# Patient Record
Sex: FEMALE | Race: WHITE | Employment: FULL TIME | URBAN - METROPOLITAN AREA
[De-identification: names, ages, dates, MRNs, and addresses within clinical notes are randomized per-mention and may not be internally consistent; named-entity substitution may affect disease eponyms.]

---

## 2022-06-13 ENCOUNTER — OFFICE VISIT (OUTPATIENT)
Dept: URGENT CARE | Facility: MEDICAL CENTER | Age: 42
End: 2022-06-13
Payer: COMMERCIAL

## 2022-06-13 VITALS
BODY MASS INDEX: 35.87 KG/M2 | HEART RATE: 80 BPM | HEIGHT: 61 IN | OXYGEN SATURATION: 99 % | SYSTOLIC BLOOD PRESSURE: 148 MMHG | RESPIRATION RATE: 18 BRPM | TEMPERATURE: 98 F | DIASTOLIC BLOOD PRESSURE: 82 MMHG | WEIGHT: 190 LBS

## 2022-06-13 DIAGNOSIS — M77.12 LATERAL EPICONDYLITIS OF LEFT ELBOW: Primary | ICD-10-CM

## 2022-06-13 PROCEDURE — G0382 LEV 3 HOSP TYPE B ED VISIT: HCPCS | Performed by: PHYSICIAN ASSISTANT

## 2022-06-13 PROCEDURE — S9083 URGENT CARE CENTER GLOBAL: HCPCS | Performed by: PHYSICIAN ASSISTANT

## 2022-06-13 NOTE — LETTER
June 13, 2022     Patient: Dean Marino   YOB: 1980   Date of Visit: 6/13/2022       To Whom It May Concern: It is my medical opinion that Vicky Araiza be excused from arm/elbow use at work until 06/17/2022    If you have any questions or concerns, please don't hesitate to call  Sincerely,        Milagros Jensen PA-C    CC: Eliezer Galaviz Fears

## 2022-06-13 NOTE — PROGRESS NOTES
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          History of Present Illness       Patient is a 41 yo female who presents for evaluation of left elbow pain x 3 days  She denies any injury or trauma  Pain is located in the lateral aspect of the elbow  The pain radiates into the upper arm and down the arm into the wrist  Reports she works at [employer] and does frequent repetitive movements  Denies weakness, skin changes, swelling, fevers, chills  Review of Systems   Review of Systems   Constitutional: Negative for chills and fever  Musculoskeletal: Positive for arthralgias  Negative for joint swelling  Left elbow pain         Current Medications     No current outpatient medications on file  Current Allergies     Allergies as of 2022    (No Known Allergies)            The following portions of the patient's history were reviewed and updated as appropriate: allergies, current medications, past family history, past medical history, past social history, past surgical history and problem list      History reviewed  No pertinent past medical history      Past Surgical History:   Procedure Laterality Date    CHOLECYSTECTOMY         Family History   Problem Relation Age of Onset    Heart disease Mother     Kidney failure Mother     Leukemia Mother     Kidney failure Daughter     Mental illness Family          Medications have been verified  Objective   /82   Pulse 80   Temp 98 °F (36 7 °C)   Resp 18   Ht 5' 1" (1 549 m)   Wt 86 2 kg (190 lb)   SpO2 99%   BMI 35 90 kg/m²        Physical Exam     Physical Exam  Constitutional:       General: She is not in acute distress  Appearance: She is not toxic-appearing  Cardiovascular:      Rate and Rhythm: Normal rate  Pulmonary:      Effort: Pulmonary effort is normal    Musculoskeletal:      Comments: Minimal ttp over lateral epicondyle  No overlying erythema or edema  Neurovascularly intact distally  Phalen's negative  Tinel's of the elbow and wrist negative  Skin:     General: Skin is warm and dry  Capillary Refill: Capillary refill takes less than 2 seconds  Neurological:      Mental Status: She is alert     Psychiatric:         Mood and Affect: Mood normal          Behavior: Behavior normal

## 2022-08-13 ENCOUNTER — OFFICE VISIT (OUTPATIENT)
Dept: URGENT CARE | Facility: MEDICAL CENTER | Age: 42
End: 2022-08-13
Payer: COMMERCIAL

## 2022-08-13 VITALS
TEMPERATURE: 98.1 F | WEIGHT: 190 LBS | OXYGEN SATURATION: 99 % | RESPIRATION RATE: 20 BRPM | HEART RATE: 83 BPM | SYSTOLIC BLOOD PRESSURE: 138 MMHG | BODY MASS INDEX: 35.87 KG/M2 | DIASTOLIC BLOOD PRESSURE: 82 MMHG | HEIGHT: 61 IN

## 2022-08-13 DIAGNOSIS — R07.9 CHEST PAIN, UNSPECIFIED TYPE: Primary | ICD-10-CM

## 2022-08-13 LAB
ATRIAL RATE: 83 BPM
P AXIS: 20 DEGREES
PR INTERVAL: 146 MS
QRS AXIS: 42 DEGREES
QRSD INTERVAL: 76 MS
QT INTERVAL: 376 MS
QTC INTERVAL: 441 MS
T WAVE AXIS: 64 DEGREES
VENTRICULAR RATE: 83 BPM

## 2022-08-13 PROCEDURE — 93005 ELECTROCARDIOGRAM TRACING: CPT

## 2022-08-13 PROCEDURE — 93010 ELECTROCARDIOGRAM REPORT: CPT | Performed by: INTERNAL MEDICINE

## 2022-08-13 PROCEDURE — 99213 OFFICE O/P EST LOW 20 MIN: CPT | Performed by: STUDENT IN AN ORGANIZED HEALTH CARE EDUCATION/TRAINING PROGRAM

## 2022-08-13 RX ORDER — PANTOPRAZOLE SODIUM 40 MG/1
40 TABLET, DELAYED RELEASE ORAL DAILY
Qty: 30 TABLET | Refills: 0 | Status: SHIPPED | OUTPATIENT
Start: 2022-08-13 | End: 2022-09-13

## 2022-08-13 NOTE — PROGRESS NOTES
[facility]        NAME: Kranthi Burden is a 40 y o  female  : 1980    MRN: [id number]  DATE: 2022  TIME: 10:55 AM    Assessment and Plan   Chest pain, unspecified type [R07 9]  1  Chest pain, unspecified type  pantoprazole (PROTONIX) 40 mg tablet       EKG reviewed, likely cardiac in origin, medications blood  Patient Instructions       Follow up with PCP in 3-5 days  Proceed to  ER if symptoms worsen  Chief Complaint     Chief Complaint   Patient presents with    Chest Pain     Constant mid chest pain that wraps to bilateral back; pain is worse with inspiration; started approximately one hour prior to arrival while standing at work; pain is a dull ache; nothing makes the pain worse or better          History of Present Illness       HPI  Patient presents today complaining of constant chest pain that wraps around her lateral back  Patient states the pain is worse on inspiration, she does not have any cardiac history  Patient states that the pain started about an hour ago while she was standing at work  Patient states that is a dull aching pain, nothing makes it better or worse  There is no radiation  Review of Systems   Review of Systems  pe rhpi     Current Medications       Current Outpatient Medications:     pantoprazole (PROTONIX) 40 mg tablet, Take 1 tablet (40 mg total) by mouth daily, Disp: 30 tablet, Rfl: 0    Current Allergies     Allergies as of 2022    (No Known Allergies)            The following portions of the patient's history were reviewed and updated as appropriate: allergies, current medications, past family history, past medical history, past social history, past surgical history and problem list      History reviewed  No pertinent past medical history      Past Surgical History:   Procedure Laterality Date    CHOLECYSTECTOMY         Family History   Problem Relation Age of Onset    Heart disease Mother     Kidney failure Mother     Leukemia Mother     Kidney failure Daughter     Mental illness Family          Medications have been verified  Objective   /82   Pulse 83   Temp 98 1 °F (36 7 °C) (Temporal)   Resp 20   Ht 5' 1" (1 549 m)   Wt 86 2 kg (190 lb)   SpO2 99%   BMI 35 90 kg/m²   No LMP recorded  Physical Exam     Physical Exam  Constitutional:       General: She is not in acute distress  Appearance: Normal appearance  HENT:      Head: Normocephalic  Nose: No congestion or rhinorrhea  Mouth/Throat:      Mouth: Mucous membranes are moist       Pharynx: No oropharyngeal exudate or posterior oropharyngeal erythema  Eyes:      General:         Right eye: No discharge  Left eye: No discharge  Conjunctiva/sclera: Conjunctivae normal    Cardiovascular:      Rate and Rhythm: Normal rate and regular rhythm  Pulses: Normal pulses  Heart sounds: Normal heart sounds  No murmur heard  No friction rub  Pulmonary:      Effort: Pulmonary effort is normal  No respiratory distress  Breath sounds: No stridor  Abdominal:      General: Abdomen is flat  There is no distension  Palpations: Abdomen is soft  Tenderness: There is no abdominal tenderness  Musculoskeletal:      Cervical back: Neck supple  Lymphadenopathy:      Cervical: No cervical adenopathy  Skin:     General: Skin is warm  Capillary Refill: Capillary refill takes less than 2 seconds  Neurological:      Mental Status: She is alert and oriented to person, place, and time

## 2022-09-04 DIAGNOSIS — R07.9 CHEST PAIN, UNSPECIFIED TYPE: ICD-10-CM

## 2022-09-13 RX ORDER — PANTOPRAZOLE SODIUM 40 MG/1
TABLET, DELAYED RELEASE ORAL
Qty: 90 TABLET | Refills: 1 | Status: SHIPPED | OUTPATIENT
Start: 2022-09-13

## 2024-02-28 ENCOUNTER — HOSPITAL ENCOUNTER (EMERGENCY)
Facility: HOSPITAL | Age: 44
Discharge: HOME | End: 2024-02-28
Attending: EMERGENCY MEDICINE
Payer: COMMERCIAL

## 2024-02-28 VITALS
RESPIRATION RATE: 16 BRPM | TEMPERATURE: 97.3 F | OXYGEN SATURATION: 100 % | DIASTOLIC BLOOD PRESSURE: 94 MMHG | SYSTOLIC BLOOD PRESSURE: 148 MMHG | HEART RATE: 105 BPM

## 2024-02-28 DIAGNOSIS — K02.9 DENTAL CARIES: ICD-10-CM

## 2024-02-28 DIAGNOSIS — K08.89 DENTALGIA: Primary | ICD-10-CM

## 2024-02-28 DIAGNOSIS — K04.7 DENTAL INFECTION: ICD-10-CM

## 2024-02-28 PROCEDURE — 99284 EMERGENCY DEPT VISIT MOD MDM: CPT | Performed by: EMERGENCY MEDICINE

## 2024-02-28 PROCEDURE — 99282 EMERGENCY DEPT VISIT SF MDM: CPT

## 2024-02-28 PROCEDURE — 64400 NJX AA&/STRD TRIGEMINAL NRV: CPT | Performed by: EMERGENCY MEDICINE

## 2024-02-28 PROCEDURE — 96372 THER/PROPH/DIAG INJ SC/IM: CPT

## 2024-02-28 RX ORDER — KETOROLAC TROMETHAMINE 30 MG/ML
30 INJECTION, SOLUTION INTRAMUSCULAR; INTRAVENOUS ONCE
Status: COMPLETED | OUTPATIENT
Start: 2024-02-28 | End: 2024-02-28

## 2024-02-28 RX ORDER — AMOXICILLIN AND CLAVULANATE POTASSIUM 875; 125 MG/1; MG/1
1 TABLET, FILM COATED ORAL EVERY 12 HOURS
Qty: 13 TABLET | Refills: 0 | Status: SHIPPED | OUTPATIENT
Start: 2024-02-28 | End: 2024-03-06

## 2024-02-28 RX ORDER — AMOXICILLIN AND CLAVULANATE POTASSIUM 875; 125 MG/1; MG/1
1 TABLET, FILM COATED ORAL ONCE
Status: COMPLETED | OUTPATIENT
Start: 2024-02-28 | End: 2024-02-28

## 2024-02-28 RX ORDER — CHLORHEXIDINE GLUCONATE ORAL RINSE 1.2 MG/ML
15 SOLUTION DENTAL 2 TIMES DAILY
Qty: 120 ML | Refills: 0 | Status: SHIPPED | OUTPATIENT
Start: 2024-02-28

## 2024-02-28 RX ORDER — ACETAMINOPHEN 325 MG/1
975 TABLET ORAL ONCE
Status: COMPLETED | OUTPATIENT
Start: 2024-02-28 | End: 2024-02-28

## 2024-02-28 RX ADMIN — ACETAMINOPHEN 975 MG: 325 TABLET, FILM COATED ORAL at 20:10

## 2024-02-28 RX ADMIN — AMOXICILLIN AND CLAVULANATE POTASSIUM 1 TABLET: 875; 125 TABLET, FILM COATED ORAL at 20:10

## 2024-02-28 RX ADMIN — KETOROLAC TROMETHAMINE 30 MG: 30 INJECTION, SOLUTION INTRAMUSCULAR at 20:11

## 2024-02-29 NOTE — DISCHARGE INSTRUCTIONS
Please use the following pain medications as prescribed:  - Tylenol 650mg every 6 hours  - Motrin 400mg every 6 hours  - Orajel OTC  They work in different ways so can be used together at the same time.     We will give a script for peridex wash. This is an anti-septic which will sterilize the area and prevent and infection from occurring. This can only be used for 2-3 weeks. If you use it longer, it might cause some pink staining of your teeth. It works like a super powered form of Listerine.     Lastly, I am starting you on antibiotics. I would like you to take the full course as prescribed. This should help the pain and swelling.     If despite the above you have worsening symptoms please return to the emergency room for re-evaluation.     If you HAVE A DENTIST, please call to make an appointment with them for follow-up as soon as possible.  If you DO NOT have a dentist, please call [phone]. It is a service that will help you find a dentist in your area and based on your insurance (if you have it or not) after you answer some questions    You may also follow-up with the [facility] if you don't have a density.   Dental Clinic - [facility]  [address] [place] [id number]  [phone]  They will see you with or without insurance and have walk in hours in the morning without appointments.     Other local dental clinics include:   DENTAL CLINICS    [facility]  [address] [place] [address]  [phone]  Walk in house M-F 8a-noon  Emergency Dental Care    [facility]  [address], 2nd [place] [address]  [phone]    Dr. Abdiel Clifford  [address] [place] [address]  Takes adults & children on a waiting list    [facility]  [address] [place] [address]  [phone]  Walk in scheduled only M-F 8a-noon & 1p-4p  Uninsured received  40% discount & payments  Payment must be made upfront before the service  Emergency Dental Care    [facility]  [address] [facility]  [place] Pedroza [address]  OR  [address] [place] [address]  [phone]    [facility]  [facility]  [phone]

## 2024-02-29 NOTE — ED PROVIDER NOTES
History  Chief Complaint   Patient presents with    Dental Pain     Pt presents to the ed with right lower dental pain, that started 4 days ago, has dentist appointment but can't stand the pain, reports taking aleve at 1200     44-year-old female presents to the ED for evaluation of dental pain.  The patient states that she started experiencing right lower posterior dental pain over the last 4 days.  She states that she made an appointment with her dentist, however the appointment is not for another 2 weeks and she has had difficulty managing the pain today.  She last took Aleve at noon today.  She denies any other symptoms or complaints.        Prior to Admission Medications   Prescriptions Last Dose Informant Patient Reported? Taking?   pantoprazole (PROTONIX) 40 mg tablet   No No   Sig: TAKE 1 TABLET BY MOUTH EVERY DAY      Facility-Administered Medications: None       History reviewed. No pertinent past medical history.    Past Surgical History:   Procedure Laterality Date    CHOLECYSTECTOMY         Family History   Problem Relation Age of Onset    Heart disease Mother     Kidney failure Mother     Leukemia Mother     Kidney failure Daughter     Mental illness Family      I have reviewed and agree with the history as documented.    E-Cigarette/Vaping     E-Cigarette/Vaping Substances     Social History     Tobacco Use    Smoking status: Every Day     Current packs/day: 0.25     Average packs/day: 0.3 packs/day for 11.0 years (2.8 ttl pk-yrs)     Types: Cigarettes       Review of Systems   Constitutional:  Negative for chills and fever.   HENT:  Positive for dental problem. Negative for congestion, rhinorrhea and sore throat.    Respiratory:  Negative for cough and shortness of breath.    Cardiovascular:  Negative for chest pain and palpitations.   Gastrointestinal:  Negative for abdominal pain, diarrhea, nausea and vomiting.   Genitourinary:  Negative for dysuria and hematuria.   Musculoskeletal:  Negative for  back pain and neck pain.   Neurological:  Negative for dizziness, weakness, light-headedness, numbness and headaches.   All other systems reviewed and are negative.      Physical Exam  Physical Exam  Vitals and nursing note reviewed.   Constitutional:       General: She is not in acute distress.     Appearance: Normal appearance. She is normal weight. She is not ill-appearing.   HENT:      Head: Normocephalic and atraumatic.      Right Ear: External ear normal.      Left Ear: External ear normal.      Nose: Nose normal. No congestion or rhinorrhea.      Mouth/Throat:      Mouth: Mucous membranes are moist.      Pharynx: Oropharynx is clear. No oropharyngeal exudate or posterior oropharyngeal erythema.      Comments: Poor dentition, multiple dental caries.  The right posterior molar appears carious with most of the decaying located at the base of the to near the gumline, and with mild tenderness palpation over the tooth.  No gingival swelling or evidence of periapical abscess.  Eyes:      Extraocular Movements: Extraocular movements intact.      Conjunctiva/sclera: Conjunctivae normal.      Pupils: Pupils are equal, round, and reactive to light.   Cardiovascular:      Rate and Rhythm: Normal rate and regular rhythm.      Pulses: Normal pulses.      Heart sounds: Normal heart sounds. No murmur heard.  Pulmonary:      Effort: Pulmonary effort is normal. No respiratory distress.      Breath sounds: Normal breath sounds. No wheezing or rales.   Abdominal:      General: Abdomen is flat. Bowel sounds are normal. There is no distension.      Palpations: Abdomen is soft.      Tenderness: There is no abdominal tenderness. There is no right CVA tenderness, left CVA tenderness or guarding.   Musculoskeletal:         General: No swelling or tenderness. Normal range of motion.      Cervical back: Normal range of motion and neck supple. No tenderness.   Skin:     General: Skin is warm and dry.      Capillary Refill: Capillary  refill takes less than 2 seconds.   Neurological:      General: No focal deficit present.      Mental Status: She is alert and oriented to person, place, and time.         Vital Signs  ED Triage Vitals   Temperature Pulse Respirations Blood Pressure SpO2   02/28/24 1954 02/28/24 1954 02/28/24 1954 02/28/24 1954 02/28/24 1954   (!) 97.3 °F (36.3 °C) 105 16 148/94 100 %      Temp Source Heart Rate Source Patient Position - Orthostatic VS BP Location FiO2 (%)   02/28/24 1954 02/28/24 1954 02/28/24 1954 02/28/24 1954 --   Oral Monitor Sitting Left arm       Pain Score       02/28/24 2010 9           Vitals:    02/28/24 1954   BP: 148/94   Pulse: 105   Patient Position - Orthostatic VS: Sitting         Visual Acuity      ED Medications  Medications   ketorolac (TORADOL) injection 30 mg (30 mg Intramuscular Given 2/28/24 2011)   acetaminophen (TYLENOL) tablet 975 mg (975 mg Oral Given 2/28/24 2010)   amoxicillin-clavulanate (AUGMENTIN) 875-125 mg per tablet 1 tablet (1 tablet Oral Given 2/28/24 2010)       Diagnostic Studies  Results Reviewed       None                   No orders to display              Procedures  Nerve block    Date/Time: 2/28/2024 8:04 PM    Performed by: Godfrey Toth MD  Authorized by: Godfrey Toth MD    Patient location:  ED  [place] Protocol:  Consent: Verbal consent obtained.  Risks and benefits: risks, benefits and alternatives were discussed  Consent given by: patient  Required items: required blood products, implants, devices, and special equipment available  Patient identity confirmed: verbally with patient and arm band    Indications:     Indications:  Pain relief  Location:     Body area:  Head    Head nerve blocked: Right inferior alveolar dental nerve block.    Nerve type:  Peripheral    Laterality:  Right  Pre-procedure details:     Preparation: Patient was prepped and draped in usual sterile fashion    Procedure details (see MAR for exact dosages):     Block needle gauge:  25  G    Anesthetic injected:  Ropivacaine 0.5% (2 mL)    Steroid injected:  None    Additive injected:  None    Injection procedure:  Anatomic landmarks identified, anatomic landmarks palpated, incremental injection, introduced needle and negative aspiration for blood  Post-procedure details:     Dressing:  None    Outcome:  Pain relieved    Patient tolerance of procedure:  Tolerated well, no immediate complications           ED Course                               SBIRT 20yo+      Flowsheet Row Most Recent Value   Initial Alcohol Screen: US AUDIT-C     1. How often do you have a drink containing alcohol? 0 Filed at: 02/28/2024 1953   2. How many drinks containing alcohol do you have on a typical day you are drinking?  0 Filed at: 02/28/2024 1953   3a. Male UNDER 65: How often do you have five or more drinks on one occasion? 0 Filed at: 02/28/2024 1953   3b. FEMALE Any Age, or MALE 65+: How often do you have 4 or more drinks on one occassion? 0 Filed at: 02/28/2024 1953   Audit-C Score 0 Filed at: 02/28/2024 1953   JACKELINE: How many times in the past year have you...    Used an illegal drug or used a prescription medication for non-medical reasons? Never Filed at: 02/28/2024 1953                      Medical Decision Making  44-year-old female presents to the ED for evaluation of dental pain.  The patient states that she started experiencing right lower posterior dental pain over the last 4 days.  She states that she made an appointment with her dentist, however the appointment is not for another 2 weeks and she has had difficulty managing the pain today.  She last took Aleve at noon today.  She denies any other symptoms or complaints.    Vital signs reviewed.  See physical exam documentation for exam findings.  Differential diagnosis includes but is not limited to dental caries, dentalgia, and/or early dental infection without evidence of periapical abscess.  Will treat with analgesic medications and course of Augmentin.   I discussed additional treatment options including inferior alveolar nerve block and the patient is agreeable to this for pain relief.  Right inferior alveolar dental block performed as documented in procedure note with improvement in the patient's pain.  Also provided dental clinic referral if patient is unable to follow-up sooner with her dentist. I discussed all findings, treatment, red flags/return precautions, and outpatient follow-up and the patient/family understand and agree. Stable for discharge.    Risk  OTC drugs.  Prescription drug management.             Disposition  Final diagnoses:   Dentalgia   Dental infection   Dental caries     Time reflects when diagnosis was documented in both MDM as applicable and the Disposition within this note       Time User Action Codes Description Comment    2/28/2024  8:31 PM Godfrey Toth [K08.89] Dentalgia     2/28/2024  8:31 PM Godfrey Toth [K04.7] Dental infection     2/28/2024  8:31 PM Godfrey Toth [K02.9] Dental caries           ED Disposition       ED Disposition   Discharge    Condition   Stable    Date/Time   Wed Feb 28, 2024 2031    Comment   Laura Mcnally discharge to home/self care.                   Follow-up Information       Follow up With Specialties Details Why Contact Info Additional Information    [facility] Call in 1 day For follow-up [address] [place] [id number] [phone] [facility], [address] [place] [address], [phone], [phone]    [facility] Emergency Department Emergency Medicine Go to  If symptoms worsen [address] [address]  [phone] [facility] Emergency Department, [address] [address]            Discharge Medication List as of 2/28/2024  8:32 PM        START taking these medications    Details   amoxicillin-clavulanate (AUGMENTIN) 875-125 mg per tablet Take  1 tablet by mouth every 12 (twelve) hours for 7 days, Starting Wed 2/28/2024, Until Wed 3/6/2024, Normal      chlorhexidine (PERIDEX) 0.12 % solution Apply 15 mL to the mouth or throat 2 (two) times a day, Starting Wed 2/28/2024, Normal           CONTINUE these medications which have NOT CHANGED    Details   pantoprazole (PROTONIX) 40 mg tablet TAKE 1 TABLET BY MOUTH EVERY DAY, Normal                 PDMP Review       None            ED Provider  Electronically Signed by             Godfrey Toth MD  02/28/24 3814

## 2024-05-09 ENCOUNTER — VBI (OUTPATIENT)
Dept: ADMINISTRATIVE | Facility: OTHER | Age: 44
End: 2024-05-09

## 2025-04-17 ENCOUNTER — APPOINTMENT (OUTPATIENT)
Dept: URGENT CARE | Facility: MEDICAL CENTER | Age: 45
End: 2025-04-17